# Patient Record
Sex: MALE | Race: WHITE | URBAN - METROPOLITAN AREA
[De-identification: names, ages, dates, MRNs, and addresses within clinical notes are randomized per-mention and may not be internally consistent; named-entity substitution may affect disease eponyms.]

---

## 2017-05-08 ENCOUNTER — HOSPITAL ENCOUNTER (INPATIENT)
Facility: CLINIC | Age: 22
LOS: 2 days | Discharge: HOME | DRG: 885 | End: 2017-05-10
Attending: PSYCHIATRY & NEUROLOGY | Admitting: PSYCHIATRY & NEUROLOGY
Payer: COMMERCIAL

## 2017-05-08 DIAGNOSIS — F32.2 SEVERE SINGLE CURRENT EPISODE OF MAJOR DEPRESSIVE DISORDER, WITHOUT PSYCHOTIC FEATURES (H): Primary | ICD-10-CM

## 2017-05-08 PROBLEM — R45.851 SUICIDAL IDEATION: Status: ACTIVE | Noted: 2017-05-08

## 2017-05-08 PROCEDURE — 99222 1ST HOSP IP/OBS MODERATE 55: CPT | Mod: AI | Performed by: PSYCHIATRY & NEUROLOGY

## 2017-05-08 PROCEDURE — H2032 ACTIVITY THERAPY, PER 15 MIN: HCPCS

## 2017-05-08 PROCEDURE — 99207 ZZC CDG-MDM COMPONENT: MEETS LOW - DOWN CODED: CPT | Performed by: PSYCHIATRY & NEUROLOGY

## 2017-05-08 PROCEDURE — 12400001 ZZH R&B MH UMMC

## 2017-05-08 PROCEDURE — 25000132 ZZH RX MED GY IP 250 OP 250 PS 637: Performed by: PSYCHIATRY & NEUROLOGY

## 2017-05-08 RX ORDER — HYDROXYZINE HYDROCHLORIDE 25 MG/1
25-50 TABLET, FILM COATED ORAL EVERY 4 HOURS PRN
Status: DISCONTINUED | OUTPATIENT
Start: 2017-05-08 | End: 2017-05-10 | Stop reason: HOSPADM

## 2017-05-08 RX ORDER — ALUMINA, MAGNESIA, AND SIMETHICONE 2400; 2400; 240 MG/30ML; MG/30ML; MG/30ML
30 SUSPENSION ORAL EVERY 4 HOURS PRN
Status: DISCONTINUED | OUTPATIENT
Start: 2017-05-08 | End: 2017-05-10 | Stop reason: HOSPADM

## 2017-05-08 RX ORDER — BUPROPION HYDROCHLORIDE 150 MG/1
150 TABLET ORAL DAILY
Status: DISCONTINUED | OUTPATIENT
Start: 2017-05-08 | End: 2017-05-10 | Stop reason: HOSPADM

## 2017-05-08 RX ORDER — ACETAMINOPHEN 325 MG/1
650 TABLET ORAL EVERY 4 HOURS PRN
Status: DISCONTINUED | OUTPATIENT
Start: 2017-05-08 | End: 2017-05-10 | Stop reason: HOSPADM

## 2017-05-08 RX ORDER — BISACODYL 10 MG
10 SUPPOSITORY, RECTAL RECTAL DAILY PRN
Status: DISCONTINUED | OUTPATIENT
Start: 2017-05-08 | End: 2017-05-10 | Stop reason: HOSPADM

## 2017-05-08 RX ORDER — TRAZODONE HYDROCHLORIDE 50 MG/1
50 TABLET, FILM COATED ORAL
Status: DISCONTINUED | OUTPATIENT
Start: 2017-05-08 | End: 2017-05-10 | Stop reason: HOSPADM

## 2017-05-08 RX ORDER — TRAZODONE HYDROCHLORIDE 50 MG/1
50 TABLET, FILM COATED ORAL AT BEDTIME
Status: DISCONTINUED | OUTPATIENT
Start: 2017-05-08 | End: 2017-05-10 | Stop reason: HOSPADM

## 2017-05-08 RX ADMIN — BUPROPION HYDROCHLORIDE 150 MG: 150 TABLET, FILM COATED, EXTENDED RELEASE ORAL at 10:31

## 2017-05-08 RX ADMIN — TRAZODONE HYDROCHLORIDE 50 MG: 50 TABLET ORAL at 22:01

## 2017-05-08 ASSESSMENT — ACTIVITIES OF DAILY LIVING (ADL)
ORAL_HYGIENE: INDEPENDENT
HYGIENE/GROOMING: INDEPENDENT
GROOMING: INDEPENDENT
LAUNDRY: UNABLE TO COMPLETE
ORAL_HYGIENE: INDEPENDENT
DRESS: INDEPENDENT

## 2017-05-08 NOTE — PROGRESS NOTES
05/08/17 1600   Art Therapy   Type of Intervention structured groups   Response participates with encouragement   Hours 2.5   Client participated in groups and was pleasant and cooperative. He has a coping skills list he says he refers to and uses when at home/college.

## 2017-05-08 NOTE — IP AVS SNAPSHOT
[facility]    [facility] [address]    [address] [address] [address]    Phone:  [phone]                                       After Visit Summary   5/8/2017    Shaq Chin    MRN: [id number]           After Visit Summary Signature Page     I have received my discharge instructions, and my questions have been answered. I have discussed any challenges I see with this plan with the nurse or doctor.    ..........................................................................................................................................  Patient/Patient Representative Signature      ..........................................................................................................................................  Patient Representative Print Name and Relationship to Patient    ..................................................               ................................................  Date                                            Time    ..........................................................................................................................................  Reviewed by Signature/Title    ...................................................              ..............................................  Date                                                            Time

## 2017-05-08 NOTE — IP AVS SNAPSHOT
MRN:[id number]                      After Visit Summary   5/8/2017    Shaq Chin    MRN: [id number]           Patient Information     Date Of Birth          1995        Designated Caregiver       Most Recent Value    Caregiver    Will someone help with your care after discharge? yes    Name of designated caregiver unsure    Phone number of caregiver unsure    Caregiver address unsure      About your hospital stay     You were admitted on:  May 8, 2017 You last received care in the:  [facility]    You were discharged on:  May 10, 2017       Who to Call     For medical emergencies, please call 911.  For non-urgent questions about your medical care, please call your primary care provider or clinic, None          Attending Provider     Provider Specialty    Mark aMriee MD Psychiatry    Brittney Baltazar MD Psychiatry       Primary Care Provider    None Specified       No primary provider on file.        Further instructions from your care team       Behavioral Discharge Planning and Instructions      Summary: You were admitted on 5/8/2017 to [facility] for suicidal ideation.  You were treated by Dr. Baltazar and discharged on 5/10/17 .     Disposition: Discharged to home    Main Diagnosis:  Major depressive disorder, recurrent, severe without psychotic features    Health Care Follow-up Appointments:   [facility]  Call [facility] to schedule an appointment with a counselor. You may also request to be seen by Nurse Practitioner for medication management.   Phone:  [phone]  Address: [address] [place] [address] [facility]. [place] [address]        Attend all scheduled appointments with your outpatient providers. Call at least 24 hours in advance if you need to reschedule an appointment to ensure continued access to your outpatient providers.   Major Treatments, Procedures and Findings: You were provided with: a psychiatric assessment, assessed for  "medical stability, medication evaluation and/or management, group therapy and milieu management    Symptoms to Report: feeling more aggressive, increased confusion, losing more sleep, mood getting worse or thoughts of suicide    Early warning signs can include:  increased depression or anxiety sleep disturbances increased thoughts or behaviors of suicide or self-harm     Safety and Wellness:  Take all medicines as directed.  Make no changes unless your doctor suggests them.      Follow treatment recommendations.  Refrain from alcohol and non-prescribed drugs.  If there is a concern for safety, call [phone].    Resources: Mental health crisis response for your county is offered 24 hours a day, 7 days a week. A trained counselor will assess your current situation, offer support and counseling and connect you with local resources. Please call  [phone]    The treatment team has appreciated the opportunity to work with you. Shaq, please take care and make your recovery a daily recovery. If you have any questions or concerns our unit number is [phone].  You will be receiving a follow-up phone call within the next three days from a representative from behavioral health.  You have identified the best phone number to reach you as [phone]              Pending Results     No orders found from 5/6/2017 to 5/9/2017.            Admission Information     Date & Time Provider Department Dept. Phone    5/8/2017 Brittney Baltazar MD [facility] [phone]      Your Vitals Were     Blood Pressure Pulse Temperature Respirations Height Weight    124/83 96 96.5  F (35.8  C) (Oral) 16 1.81 m (5' 11.26\") 82 kg (180 lb 12.4 oz)    Pulse Oximetry BMI (Body Mass Index)                98% 25.03 kg/m2          [employer] Information     [employer] lets you send messages to your doctor, view your test results, renew your prescriptions, schedule appointments and more. To sign up, go to [email or web address] . " "Click on \"Log in\" on the left side of the screen, which will take you to the Welcome page. Then click on \"Sign up Now\" on the right side of the page.     You will be asked to enter the access code listed below, as well as some personal information. Please follow the directions to create your username and password.     Your access code is: [email or web address]  Expires: 2017  2:00 PM     Your access code will  in 90 days. If you need help or a new code, please call your [facility] clinic or [phone].        Care EveryWhere ID     This is your Care EveryWhere ID. This could be used by other organizations to access your [facility] medical records  [id number]           Review of your medicines      START taking        Dose / Directions    buPROPion 150 MG 24 hr tablet   Commonly known as:  WELLBUTRIN XL   Used for:  Severe single current episode of major depressive disorder, without psychotic features (H)        Dose:  150 mg   Take 1 tablet (150 mg) by mouth daily   Quantity:  30 tablet   Refills:  0       traZODone 50 MG tablet   Commonly known as:  DESYREL   Used for:  Severe single current episode of major depressive disorder, without psychotic features (H)        Dose:  50 mg   Take 1 tablet (50 mg) by mouth At Bedtime   Quantity:  30 tablet   Refills:  0            Where to get your medicines      These medications were sent to [facility] [address]  [address] [address]     Phone:  [phone]     buPROPion 150 MG 24 hr tablet    traZODone 50 MG tablet                Protect others around you: Learn how to safely use, store and throw away your medicines at www.disposemymeds.org.             Medication List: This is a list of all your medications and when to take them. Check marks below indicate your daily home schedule. Keep this list as a reference.      Medications           Morning Afternoon Evening Bedtime As Needed    buPROPion 150 MG 24 hr " tablet   Commonly known as:  WELLBUTRIN XL   Take 1 tablet (150 mg) by mouth daily   Last time this was given:  150 mg on 5/10/2017  8:17 AM                                   traZODone 50 MG tablet   Commonly known as:  DESYREL   Take 1 tablet (50 mg) by mouth At Bedtime   Last time this was given:  50 mg on 5/9/2017 10:27 PM

## 2017-05-08 NOTE — PROGRESS NOTES
05/08/17 0544   Patient Belongings   Did you bring any home meds/supplements to the hospital?  No   Patient Belongings cell phone/electronics;clothing;glasses;keys;money (see comment);shoes;wallet   Disposition of Belongings Cash, ID, & Debit card to security. All other items are in patient's storage bin.   Belongings Search Yes   Clothing Search Yes   Second Staff Keaton FLOWER (3A)     Items sent to security in envelope # [id number]: $80.00 cash; [place] drivers license; [employer] health insurance card; V[employer] debit card ending in [id number]    Items in patient's storage bin: shoes w/ laces; belt; blue jeans; lanyard, keys, & keychain; gray stocking cap; blue sweater; green packers t-shirt; black smart phone; black socks; earbud headphones; black wallet;    ADMISSION: I am responsible for any personal items that are not sent to the safe or pharmacy. [facility] is not responsible for loss, theft or damage of any property in my possession.  Patient Signature _____________________ Date/Time _____________________  Staff Signature _______________________ Date/Time _____________________  2nd Staff person, if patient is unable/unwilling to sign  ___________________________________ Date/Time _____________________    DISCHARGE: My personal items have been returned to me.   Patient Signature _____________________ Date/Time _____________________

## 2017-05-08 NOTE — PROGRESS NOTES
"Patient did have a  \"goodshift\" , stated daily goal to \"get situated with treatment\" and plans towards discharge include\" get back on medication and work on coping skills\"  Pt .attended, participate in groups,socialized  and was visible in the milieu. Pt appears normal for age, alert and calm Pt indicated feeling depressed @ 4/10 and anxious @ 3/10. Patient had agood concentration.  Patient is cooperative, pleasant and calm, affect is subdued, flat and is hopeful. Patient denies  pain. Patient is eating 100%.  Patient reports suicidal ideation with out intention or a suicidal plan. Pt denies SIB,HI: denies current or recent homicidal ideation or behaviors. Hallucinations: NO. Patient is progressing toward goals.   Concerns (explain) - keeping alive and how death will affect  Family and loved ones\" Patient evaluation continues as patient is encouraged to participate in groups an develop healthy coping skills.   05/08/17 1201   Behavioral Health   Hallucinations denies / not responding to hallucinations   Thinking intact   Orientation person: oriented;place: oriented;date: oriented;time: oriented   Memory baseline memory   Insight admits / accepts;insight appropriate to situation;insight appropriate to events   Judgement impaired   Eye Contact at examiner   Affect blunted, flat;full range affect   Mood depressed;anxious;mood is calm   Physical Appearance/Attire disheveled;neat   Hygiene well groomed   Suicidality (WDL) (Pt indicated SI thoughts only with no intent to act)   Suicidality (Pt indicated IS thoughts only with no intent to act)   Self Injury (Pt denies SIB)   Activity (Pt participated in grps, socialized , present in the milieu)   Speech clear;coherent   Medication Sensitivity no stated side effects;no observed side effects   Psychomotor / Gait balanced;steady   Psycho Education   Type of Intervention 1:1 intervention   Response participates, initiates socially appropriate   Hours 0.5   Activities of Daily " Living   Hygiene/Grooming independent   Oral Hygiene independent   Activity   Activity Level of Assistance independent   Behavioral Health Interventions   Depression encourage nutrition and hydration;encourage participation / independence with adls;provide emotional support;establish therapeutic relationship;assist with developing and utilizing healthy coping strategies;build upon strengths   Social and Therapeutic Interventions (Depression) encourage socialization with peers;encourage effective boundaries with peers;encourage participation in therapeutic groups and milieu activities

## 2017-05-09 PROCEDURE — 25000132 ZZH RX MED GY IP 250 OP 250 PS 637: Performed by: PSYCHIATRY & NEUROLOGY

## 2017-05-09 PROCEDURE — 97150 GROUP THERAPEUTIC PROCEDURES: CPT | Mod: GO

## 2017-05-09 PROCEDURE — 99232 SBSQ HOSP IP/OBS MODERATE 35: CPT | Performed by: PSYCHIATRY & NEUROLOGY

## 2017-05-09 PROCEDURE — 99207 ZZC CDG-MDM COMPONENT: MEETS LOW - DOWN CODED: CPT | Performed by: PSYCHIATRY & NEUROLOGY

## 2017-05-09 PROCEDURE — 90853 GROUP PSYCHOTHERAPY: CPT

## 2017-05-09 PROCEDURE — 12400001 ZZH R&B MH UMMC

## 2017-05-09 RX ORDER — TRAZODONE HYDROCHLORIDE 50 MG/1
50 TABLET, FILM COATED ORAL AT BEDTIME
Qty: 30 TABLET | Refills: 0 | Status: SHIPPED | OUTPATIENT
Start: 2017-05-09

## 2017-05-09 RX ORDER — BUPROPION HYDROCHLORIDE 150 MG/1
150 TABLET ORAL DAILY
Qty: 30 TABLET | Refills: 0 | Status: SHIPPED | OUTPATIENT
Start: 2017-05-09

## 2017-05-09 RX ADMIN — BUPROPION HYDROCHLORIDE 150 MG: 150 TABLET, FILM COATED, EXTENDED RELEASE ORAL at 08:24

## 2017-05-09 RX ADMIN — TRAZODONE HYDROCHLORIDE 50 MG: 50 TABLET ORAL at 22:27

## 2017-05-09 ASSESSMENT — ACTIVITIES OF DAILY LIVING (ADL)
ORAL_HYGIENE: INDEPENDENT
GROOMING: INDEPENDENT
DRESS: INDEPENDENT

## 2017-05-09 NOTE — PROGRESS NOTES
"CLINICAL NUTRITION SERVICES - ASSESSMENT NOTE     Nutrition Prescription    RECOMMENDATIONS FOR MDs/PROVIDERS TO ORDER:  None at this time    Malnutrition Status:    Does not meet criteria    Recommendations already ordered by Registered Dietitian (RD):  Regular diet as tolerated    Future/Additional Recommendations:  None at this time       REASON FOR ASSESSMENT  Shaq Chin is a/an 22 year old male assessed by the dietitian for Admission Nutrition Risk Screen for reduced oral intake over the last month    NUTRITION HISTORY  Pt reports decreased intakes over past month 2/2 lack of motivation to buy/prepare meals    CURRENT NUTRITION ORDERS  Diet: Regular  Intake/Tolerance: Eating 100% of meals since admission    LABS  Labs reviewed    MEDICATIONS  Medications reviewed    ANTHROPOMETRICS  Height: 181 cm (5' 11.26\")  Most Recent Weight: 82 kg (180 lb 12.4 oz)    IBW: 78.2 kg  BMI: Overweight BMI 25-29.9  Weight History: pt denies any recent wt changes  Dosing Weight: 82 kg    ASSESSED NUTRITION NEEDS  Estimated Energy Needs: 3072-1636 kcals/day (25 - 30 kcals/kg)  Justification: Maintenance  Estimated Protein Needs: 66-82 grams protein/day (0.8 - 1 grams of pro/kg)  Justification: Maintenance  Estimated Fluid Needs: 2000 mL/day (1 mL/kcal)   Justification: Maintenance    PHYSICAL FINDINGS  See malnutrition section below.    MALNUTRITION  % Intake: < 75% for > 7 days (non-severe)  % Weight Loss: None noted  Subcutaneous Fat Loss: None observed  Muscle Loss: None observed  Fluid Accumulation/Edema: None noted  Malnutrition Diagnosis: Patient does not meet two of the above criteria necessary for diagnosing malnutrition    NUTRITION DIAGNOSIS  No nutrition diagnosis at this time     INTERVENTIONS  Implementation  Nutrition Education: Provided education on role of RD and importance of adequate nutrition     Monitoring/Evaluation  RD to sign off at this time.  Please reconsult if further nutrition issues arise    Edel " Surekha JOINER [facility] [phone]

## 2017-05-09 NOTE — PROGRESS NOTES
Attendence: Pt. Attended scheduled 3 of 3 OT sessions today.   Observations: pt. had positive participation and contributed to group discussion with insight, pt identified running without music as a coping strategy, pt was talkative throughout groups and encouraged peers.     05/09/17 1600   Occupational Therapy   Type of Intervention structured groups   Response Participates   Hours 3

## 2017-05-09 NOTE — H&P
DATE OF ADMISSION:  2017   DATE OF SERVICE:  2017      CHIEF COMPLAINT:  Worsening depression, suicidal ideation and self-injurious behavior.      HISTORY OF PRESENT ILLNESS:  Shaq Chin is a 22-year-old male who reports that for the last 7 years he has been having depression and suicidal ideation.  Symptoms improved with the past treatment of medication and therapy, but he has not taken medication for a while now because his symptoms got better.  Medications include Wellbutrin and trazodone.  He says lately his cat  and he is a freshman in college, so there has been academic pressure.  He has anhedonia.  He has little energy level.  His appetite is decreased.  He has insomnia.  Lately he restarted cutting his wrist and neck again.      PAST PSYCHIATRIC HISTORY:  The patient took medications and received therapy in the past.  Most recently he took Wellbutrin and trazodone which were effective.  He tried Prozac, Lexapro and Zoloft but they did not work very well.  He has never been psychiatrically hospitalized.      PAST MEDICAL HISTORY:  The patient denies any chronic or acute medical issues.      SUBSTANCE ABUSE HISTORY:  The patient denies using drugs, drinking alcohol or smoking tobacco.      PHYSICAL REVIEW OF SYSTEMS:  The patient denies problems on 10-point review of systems except as noted in HPI.      FAMILY HISTORY:  His father had alcohol and drug problems while he was alive.  He passed away from kidney issues.  Paternal aunt has depression.  Maternal cousin had suicide attempt.      SOCIAL HISTORY:  The patient is a senior in [employer].  His parents  when he was 10 years old.  His father passed away later.  He has 21-year-old and 13-year-old siblings.      ALLERGIES:  Biaxin.      PRIOR TO ADMISSION MEDICATIONS:  None.      LABORATORY RESULTS:  Not available at the time of this dictation.      VITAL SIGNS:  Temperature 96.2, respirations 16 per minute, pulse 70 per minute,  blood pressure 126/71.      PHYSICAL EXAMINATION:  I reviewed the physical exam as documented by emergency room physician on 2017.  I have no additional findings at this time.      MENTAL STATUS EXAMINATION:  The patient was attired in hospital garb.  He was appropriately groomed.  He was cooperative throughout the interview with good eye contact.  He is oriented to person, place and date.  Speech is normal in rate and volume.  Language is intact for word usage and sentence structure.  Mood is depressed, affect is restricted.  Thought process is linear and associations are intact.  Thought content is positive for suicidal ideation and self-injury.  No signs of psychosis.  No homicidal thoughts.  Psychomotor activity is within normal limits.  Muscle strength and tone are within normal limits.  Gait and station are within normal limits.  Recent and remote memory is intact.  Fund of knowledge is adequate.  Attention and concentration are intact.  Insight is fair.  Judgment is fair.      DIAGNOSIS:  Major depressive disorder, recurrent, severe without psychotic features.      PLAN:     1.  The patient was admitted to unit 4A for treatment and safety.     2.  The patient will be restarted on Wellbutrin- mg daily and trazodone 50 mg at bedtime and will adjust dosage accordingly.   3.  The patient will receive group and milieu treatment.         JER LAGUNAS MD             D: 2017 16:12   T: 2017 22:40   MT:       Name:     AUDIE VIEIRA   MRN:      [id number]        Account:      [id number]   :      1995           Admitted:     [id number]      Document: [id number]

## 2017-05-09 NOTE — DISCHARGE INSTRUCTIONS
Behavioral Discharge Planning and Instructions      Summary: You were admitted on 5/8/2017 to [facility] for suicidal ideation.  You were treated by Dr. Baltazar and discharged on 5/10/17 .     Disposition: Discharged to home    Main Diagnosis:  Major depressive disorder, recurrent, severe without psychotic features    Health Care Follow-up Appointments:   [facility]  Call [facility] to schedule an appointment with a counselor. You may also request to be seen by Nurse Practitioner for medication management.   Phone:  [phone]  Address: [address] [place] [address] [facility]. [place] [address]        Attend all scheduled appointments with your outpatient providers. Call at least 24 hours in advance if you need to reschedule an appointment to ensure continued access to your outpatient providers.   Major Treatments, Procedures and Findings: You were provided with: a psychiatric assessment, assessed for medical stability, medication evaluation and/or management, group therapy and milieu management    Symptoms to Report: feeling more aggressive, increased confusion, losing more sleep, mood getting worse or thoughts of suicide    Early warning signs can include:  increased depression or anxiety sleep disturbances increased thoughts or behaviors of suicide or self-harm     Safety and Wellness:  Take all medicines as directed.  Make no changes unless your doctor suggests them.      Follow treatment recommendations.  Refrain from alcohol and non-prescribed drugs.  If there is a concern for safety, call 911.    Resources: Mental health crisis response for your county is offered 24 hours a day, 7 days a week. A trained counselor will assess your current situation, offer support and counseling and connect you with local resources. Please call  [phone]    The treatment team has appreciated the opportunity to work with you. Shaq, please take care and make your recovery a daily recovery. If you have any questions or  concerns our unit number is [phone].  You will be receiving a follow-up phone call within the next three days from a representative from behavioral health.  You have identified the best phone number to reach you as [phone]

## 2017-05-09 NOTE — PROGRESS NOTES
Patient had a positive shift.    Shaq Chin did participate in groups and was visible in the milieu.    Mental health status: Patient maintained a full range affect and denies SI, SIB and HI.    Patient is working on these coping/social skills: patience, acceptance, vulnerability     Visitors during this shift included family.  Overall, the visit was positive.      Other information about this shift: Pt was a calm and cooperative member of the milieu.  Affect brightened during the shift. No major concerns.         05/08/17 2031   Behavioral Health   Hallucinations denies / not responding to hallucinations   Thinking intact   Orientation person: oriented;place: oriented;date: oriented;time: oriented   Memory baseline memory   Insight admits / accepts   Judgement intact   Eye Contact at examiner   Affect full range affect   Mood mood is calm   Physical Appearance/Attire attire appropriate to age and situation   Hygiene well groomed   Suicidality other (see comments)  (none stated or observed)   Self Injury other (see comment)  (none stated or observed)   Activity other (see comment)  (attended all groups; visible in milieu )   Speech clear;coherent   Medication Sensitivity no stated side effects;no observed side effects   Psychomotor / Gait balanced;steady   Activities of Daily Living   Hygiene/Grooming independent   Oral Hygiene independent   Dress independent   Laundry unable to complete   Room Organization independent   Behavioral Health Interventions   Depression maintain safety precautions;provide emotional support;establish therapeutic relationship;assist with developing and utilizing healthy coping strategies;build upon strengths   Social and Therapeutic Interventions (Depression) encourage socialization with peers;encourage effective boundaries with peers;encourage participation in therapeutic groups and milieu activities

## 2017-05-09 NOTE — DISCHARGE SUMMARY
.Psychiatric Discharge Summary    Shaq Chin MRN# [id number]   Age: 22 year old YOB: 1995     Date of Admission:  2017  Date of Discharge:  5/10/2017  Admitting Physician:  Brittney Baltazar MD  Discharge Physician:  Brittney Baltazar MD (Contact: [phone])         Event Leading to Hospitalization:   Shaq Chin is a 22-year-old male who reports that for the last 7 years he has been having depression and suicidal ideation. Symptoms improved with the past treatment of medication and therapy, but he has not taken medication for a while now because his symptoms got better. Medications include Wellbutrin and trazodone. He says lately his cat  and he is a freshman in college, so there has been academic pressure. He has anhedonia. He has little energy level. His appetite is decreased. He has insomnia. Lately he restarted cutting his wrist and neck again.        See Admission note by Brittney Baltazar MD on 2017 for additional details.          DIagnoses:     Major Depressive Disorder, recurrent, severe without psychotic features.         Labs:        No results found for: NA No results found for: CHLORIDE No results found for: BUN   No results found for: POTASSIUM No results found for: CO2 No results found for: CR            Consults:   No consultations were requested during this admission         Hospital Course:   Shaq Chin was admitted to Station 4A with attending Brittney Baltazar MD as a voluntary patient. The patient was placed under status 15 (15 minute checks) and suicide precaution to ensure patient safety.   He was restarted on Wellbutrin AD826gb and trazodone 50mg HS, which he took in the past and worked well for similar symptoms.   He tolerated these meds well without significant side effects.   He was cooperative on the unit and participated in groups.         Shaq Chin did not participate in groups and was visible in the milieu.     The patient's symptoms of  depression & si improved.     Shaq Chin was released to home. At the time of discharge Shaq Chin was determined to not be a danger to himself or others.          Discharge Medications:     Current Discharge Medication List      START taking these medications    Details   buPROPion (WELLBUTRIN XL) 150 MG 24 hr tablet Take 1 tablet (150 mg) by mouth daily  Qty: 30 tablet, Refills: 0    Associated Diagnoses: Severe single current episode of major depressive disorder, without psychotic features (H)      traZODone (DESYREL) 50 MG tablet Take 1 tablet (50 mg) by mouth At Bedtime  Qty: 30 tablet, Refills: 0    Associated Diagnoses: Severe single current episode of major depressive disorder, without psychotic features (H)                  Psychiatric Examination:   Appearance:  awake, alert  Attitude:  cooperative  Eye Contact:  good  Mood:  better  Affect:  appropriate and in normal range  Speech:  clear, coherent  Psychomotor Behavior:  no evidence of tardive dyskinesia, dystonia, or tics  Thought Process:  logical  Associations:  no loose associations  Thought Content:  no evidence of suicidal ideation or homicidal ideation  Insight:  fair  Judgment:  fair  Oriented to:  time, person, and place  Attention Span and Concentration:  fair  Recent and Remote Memory:  fair  Language: Able to name objects  Fund of Knowledge: appropriate  Muscle Strength and Tone: normal  Gait and Station: Normal         Discharge Plan:   Health Care Follow-up Appointments:   [facility]  Call [facility] to schedule an appointment with a counselor. You may also request to be seen by Nurse Practitioner for medication management.   Phone: [phone]  Address: [address] [place], [place] [address] [address]  Attestation:  The patient has been seen and evaluated by me,  Brittney Baltazar MD   Discharge time >30minutes  Discharge date: 5/10/2017    Correction: Date of Service 5/10/2017

## 2017-05-09 NOTE — PROGRESS NOTES
"[facility], [place]   Psychiatric Progress Note        Interim History:   The patient's care was discussed with the treatment team during the daily team meeting and/or staff's chart notes were reviewed.  Staff report patient has been attending groups and being cooperative with staff . Interacting with peers, too.     Pt reports he is tolerating meds well. He slept pretty well last night, waking up only once.   His mood is better. He denies si today.   As outpatient, he used to take Wellbutrin SR 150mg and trazodone 50mg HS.     Energy level is increasing.          Medications:       buPROPion  150 mg Oral Daily     traZODone  50 mg Oral At Bedtime          Allergies:     Allergies   Allergen Reactions     Biaxin [Clarithromycin] Rash          Labs:   No results found for this or any previous visit (from the past 24 hour(s)).       Psychiatric Examination:   /71  Pulse 70  Temp 97.2  F (36.2  C) (Oral)  Resp 16  Ht 1.81 m (5' 11.26\")  Wt 82 kg (180 lb 12.4 oz)  SpO2 98%  BMI 25.03 kg/m2  Weight is 180 lbs 12.44 oz  Body mass index is 25.03 kg/(m^2).    Appearance: awake, alert  Attitude:  cooperative  Eye Contact:  good  Mood:  better  Affect:  appropriate and in normal range  Speech:  clear, coherent  Psychomotor Behavior:  no evidence of tardive dyskinesia, dystonia, or tics  Throught Process:  logical  Associations:  no loose associations  Thought Content:  no evidence of suicidal ideation or homicidal ideation  Insight:  fair  Judgement:  fair  Oriented to:  time, person, and place  Attention Span and Concentration:  fair  Recent and Remote Memory:  fair         Precautions:     Behavioral Orders   Procedures     Code 1 - Restrict to Unit     Routine Programming     As clinically indicated     Self Injury Precaution     Status 15     Every 15 minutes.     Suicide precautions          DIagnoses:     Major depressive disorder, recurrent, severe without psychotic features. "        Plan:   1. Continue current meds.   2. Pt's mood just started improving. We will continue monitoring mood, appetite, sleep, energy level.   3. Continue group and milieu treatment.

## 2017-05-09 NOTE — PLAN OF CARE
"Problem: General Plan of Care (Inpatient Behavioral)  Goal: Individualization/Patient Specific Goal (IP Behavioral)  The patient and/or their representative will achieve their patient-specific goals related to the plan of care.    The patient-specific goals include:    Illness Management Recovery model: Objectives  Patient will identify reason(s) for hospitalization from their perspective.  Patient will identify a minimum of three goals for discharge.  Patient will identify a minimum of three triggers that may increase their symptoms.  Patient will identify a minimum of three coping skills they can do to stay well.   Patient will identify their support system to demonstrate readiness for discharge.    Illness Management & Recovery assists patient to develop relapse prevention as  patient identifies triggers for relapse.  patient identifies a general wellness strategy.  patient identifies the warning signs that they are in danger of relapse.  patient identifies someone they count on to get feedback .  patient identifies ways to take action when in danger of relapse.  patient identifies way to cope with stress or other symptoms.   patient participates in self-reflection.   Outcome: Improving  The patient and/or their representative will achieve their patient-specific goals related to the plan of care.    The patient-specific goals include:      \"Reasons you are in the hospital;\" The patient identifies the following reasons for current hospitalization:      1.  Suicidal thoughts  2.  cutting  3.  Severe depression         \"Goals for Discharge\" The patient identifies the following goals for discharge:     1.  Get back on meds  2.  Proper sleep schedule  3.  Get back to eating regularly              SAW Mccurdy  Clinical Treatment Coordinator         Goal: Team Discussion  Team Plan:   Outcome: Improving  Behavioral Team Discussion: (5/9/2017)     Continued Stay Criteria/Rationale: Patient admitted for Depression " and suicidal ideation.     Plan: Provider started patient on wellbutrin     Participants: 4A Provider: Dr. Brittney Baltazar MD; 4A RN's: Tano Cole, RN and Octavia Flores, RN; 4A CTC's: Josue Anne (CTC) and Helen Boyle (CTC);.     Summary/Recommendation: Patient admitted for SI with a plan. This is patient's 3rd admission for mental health reasons. Patient to be referred to psychiatrist or PCP and for individual therapy     Medical/Physical: See medical notes.      Progress: IMPROVING

## 2017-05-09 NOTE — PROGRESS NOTES
"INITIAL PSYCHOSOCIAL ASSESSMENT    I have reviewed the chart and interviewed the patient.        PRESENTING PROBLEM  Per admission telephone encounter note, \"S: pt was bib ems to [facility]. Pt is having suicidal ideation, thoughts to overdose or drive his car off a bridge.  B: pt has hx of depression. Tonight pt called a crisis line because he was cutting on his neck and arm. Pt became flustered and hung up but responders were able to trace his call to find him. Pt reports cutting on a daily basis, and cutting deeper. Pt reports poor sleep, falling grades, but is unable to identify specific stressors. Denies drugs/etoh. Not currently on meds.\"    Patient admitted to station 4A for further psychiatric evaluation and stabilization.      Stressors  Academic stress.       Legal Status      Legal status 72 Hour Hold placed on 5/8/17@2:00am  Is patient under a civil commitment/legal guardian?  No    HISTORY OF MENTAL HEALTH  Diagnosis: The patient has a history of depression   Current symptoms: depression, anxiety, suicidal thoughts, decreased appetite,  and self-injury  Past hospitalization: Yes: 1x in [place] and 1x in [facility]  Past/current commitments: No  Hx of suicidal attempts: No. \"I got really close but then I called a friend\"  SIB:  Yes: cutting wrists and neck  Hx of Aggression: No  Current medications/med compliance: taking as prescribed  Treatment History: Yes: has had individual therapy but stopped when he began to feel better      CHEMICAL HEALTH HISTORY  Drug screen results:  NA  History of CD Treatment:  Not applicable  Rule 25 needed:  Not applicable    SOCIAL HISTORY  Family description:  Parents  when patient was 10 years old. Father passed away due to kidney issues. Patient has 2 siblings (21yr old and 13yr old). Patient is single and does not have any children.   Significant Life Events (Trauma):  Denied  Major Illness:  No  Living Situation:  on college campus  Criminal hx and Legal " "Issues: No  Ethnic/Cultural Issues:  The patient does not identify any ethnic or cultural issues that impact treatment.   Spiritual Orientation: No Buddhist on file   Service History: No  Family history of psychiatric illness and chemical dependency issues:  Yes: Father with CD issues, aunt with depression, maternal cousin has attempt suicide        EDUCATIONAL/FINANCIAL/OCCUPATIONAL  Educational Background: Currently a senior at [employer]. States his Tadeo is aware he has been admitted and will notify all his professors.   Occupational History:  Full time student   Sources of Income:  Self, parent.   Insurance coverage: Payor: BCBS / Plan: BCBS OUT OF STATE / Product Type: Indemnity /   Transportation:  none  Social functioning (organization, interests):  NA    CURRENT HEALTH CARE PROVIDERS  Psychiatrist: None  Therapist: None  Primary Care: None        MENTAL STATUS EXAMINATION:    Appearance: casually groomed  Orientation:  awake and alert and oriented to time, place and person  Speech: normal rate and rhythm  Affect: Appropriate to situation  Mood:  better.   \"I got some sleep last night\"  Thought:  Logical, Linear and Goal directed  Suicide Ideation, Plan and Intent:   denies current or recent suicidal ideation or behaviors.  Self-Injurious Behavior:     reported cutting wrist and neck. Denies active thoughts on the unit  Homicidal Ideation: denied  Insight:  Fair  Judgment:  fair    Strengths:  has some insight, has family support and is medically insured.    Vulnerabilities:  lacks coping skills and no mental health providers in place.       SOCIAL SERVICE ASSESSMENT/PLAN:       Patient would benefit from follow up with a PCP or psychiatrist and individual therapy as this helped him in the past. Patient should also consider an Intensive Outpatient treatment program upon completion of finals.     CTC will consult with psychiatric provider for additional treatment recommendations.     CTC will schedule " appointments with outpatient providers for follow-up post discharge.     Patient will continue to receive therapeutic support as needed while hospitalized and is encouraged to attend therapies on the unit.       Helen Boyle [facility]  Clinical Treatment Coordinator

## 2017-05-10 VITALS
HEART RATE: 96 BPM | HEIGHT: 71 IN | RESPIRATION RATE: 16 BRPM | TEMPERATURE: 96.5 F | OXYGEN SATURATION: 98 % | DIASTOLIC BLOOD PRESSURE: 83 MMHG | SYSTOLIC BLOOD PRESSURE: 124 MMHG | WEIGHT: 180.78 LBS | BODY MASS INDEX: 25.31 KG/M2

## 2017-05-10 PROCEDURE — 25000132 ZZH RX MED GY IP 250 OP 250 PS 637: Performed by: PSYCHIATRY & NEUROLOGY

## 2017-05-10 PROCEDURE — 99239 HOSP IP/OBS DSCHRG MGMT >30: CPT | Performed by: PSYCHIATRY & NEUROLOGY

## 2017-05-10 RX ADMIN — BUPROPION HYDROCHLORIDE 150 MG: 150 TABLET, FILM COATED, EXTENDED RELEASE ORAL at 08:17

## 2017-05-10 ASSESSMENT — ACTIVITIES OF DAILY LIVING (ADL)
ORAL_HYGIENE: INDEPENDENT
LAUNDRY: WITH SUPERVISION
DRESS: INDEPENDENT
GROOMING: INDEPENDENT

## 2017-05-10 NOTE — PROGRESS NOTES
05/09/17 2000   Behavioral Health   Hallucinations other (see comment)  (none stated or observed)   Thinking intact   Orientation person: oriented;place: oriented;date: oriented;time: oriented   Memory baseline memory   Insight admits / accepts   Judgement intact   Eye Contact at examiner   Affect full range affect   Mood mood is calm   Physical Appearance/Attire attire appropriate to age and situation   Hygiene well groomed   Suicidality other (see comments)  (none stated or observed)   Self Injury other (see comment)  (none stated or observed)   Activity other (see comment)  (pt is active in the milieu)   Speech clear;coherent   Medication Sensitivity no stated side effects;no observed side effects   Psychomotor / Gait balanced;steady   Activities of Daily Living   Hygiene/Grooming independent   Oral Hygiene independent   Dress independent   Room Organization independent   Behavioral Health Interventions   Depression maintain safety precautions   Social and Therapeutic Interventions (Depression) encourage effective boundaries with peers;encourage participation in therapeutic groups and milieu activities     Patient had an overall good shift.    The pt did participate in groups and was visible in the milieu. Pt is social in the milieu and easily redirectable and cooperative.    Mental health status: Patient maintained a full range of affect. SI, SIB and HI was neither stated nor observed.

## 2017-05-10 NOTE — PLAN OF CARE
Problem: Depressive Symptoms  Goal: Depressive Symptoms  Signs and symptoms of listed problems will be absent or manageable.    Patient, prior to discharge, will:  -verbalize decrease in depressive signs/symptoms  -verbalize a decrease in anxiety   -verbalize an understanding of medication regimen 5/8/17 Completed  -verbalize absence of SI/SIB   -develop a safety plan  -identify a support system 5/8/17 Completed  -will participate in coordination of discharge planning    To promote safety/ mental health    Patient identified the following   Triggers:  ----------  Wellness Strategies:  ----------  Warning Signs:  ----------  Feedback (people they would like to receive feedback from if early warning signs - ex. Friends, family, partner/spouse, support groups):  5/8/17 Mother  ----------  Taking Action:  ----------  Ways to [place]:  5/8/17 Take medications as ordered    Self-Reflection & Planning.  Assessed patient s progress completing forms related to Illness Management Recovery (including Personal Plan of Care, Adult Coping Plan, and My Support and Coping Plan) and assisted as needed.    Encouraged patient to continue to consider triggers, wellness strategies, early warning signs, feedback from others, actions to take to prevent relapse, and coping strategies as part of a plan to remain well after leaving the hospital.           Outcome: Completed Date Met:  05/10/17  Patient has orders to discharge and is agreeable to plan to discharge. 72 hold discontinued by MD and patient signed in voluntary prior to discharge. Denied SI or self harm ideation. Reviewed all discharge instructions including medication, follow-up plan and appointments with patient. Patient verbalized understanding. Patient discharged to home at 0910 with cousin back to school. All belongings checked and sent.

## 2018-10-02 NOTE — PLAN OF CARE
Problem: Depressive Symptoms  Goal: Depressive Symptoms  Signs and symptoms of listed problems will be absent or manageable.    Patient, prior to discharge, will:  -verbalize decrease in depressive signs/symptoms  -verbalize a decrease in anxiety   -verbalize an understanding of medication regimen   -verbalize absence of SI/SIB   -develop a safety plan  -identify a support system   -will participate in coordination of discharge planning    To promote safety/ mental health    Patient identified the following   Triggers:  ----------  Wellness Strategies:  ----------  Warning Signs:  ----------  Feedback (people they would like to receive feedback from if early warning signs - ex. Friends, family, partner/spouse, support groups):  ----------  Taking Action:  ----------  Ways to [place]:      Self-Reflection & Planning.  Assessed patient s progress completing forms related to Illness Management Recovery (including Personal Plan of Care, Adult Coping Plan, and My Support and Coping Plan) and assisted as needed.    Encouraged patient to continue to consider triggers, wellness strategies, early warning signs, feedback from others, actions to take to prevent relapse, and coping strategies as part of a plan to remain well after leaving the hospital.           Pt admitted to station 4A on a 72-hour hold from [facility] for suicidal thoughts with a plan to slash his neck. Per report, pt had also reported thoughts to overdose or drive his car off a bridge. Pt has a history of depression and had called a crisis line last night due to cutting on his left arm and left side of his neck. Pt hung up the phone, but call was traced by police and police brought him to the ED. Pt was seeking help and cooperative. Upon arriving to the unit, pt was searched by two male staff. Pt was cooperative with the search, vitals, and the admission interview. Pt reported ongoing depression and cutting more severely. Pt has superficial cuts to his left  "arm and the left side of his neck. Couldn't think of specific stressors other than a cat that he had for 18 years recently dying. Pt has had two prior hospitalizations in [place], with the last one being approximately 5 years ago. Denies past suicide attempts, but stated he has \"come close.\" Pt is a senior at [employer]. Pt is not currently taking any medications and has not been seeing a psychiatrist or therapist. Stated that he has seen one in the past and will occasionally talk to a counselor at school. Pt denies drug or tobacco use. Reports using alcohol approximately once per year. Utox done in ED was negative. Pt has support from family and significant other. Pt reports SI, but does not have a plan in the hospital. Contracts for safety. Denies HI. Signed EDMUNDO for his mother at admission. Status 15, suicide and SIB precautions initiated.       " MODERATE